# Patient Record
Sex: MALE | Race: WHITE | Employment: FULL TIME | URBAN - METROPOLITAN AREA
[De-identification: names, ages, dates, MRNs, and addresses within clinical notes are randomized per-mention and may not be internally consistent; named-entity substitution may affect disease eponyms.]

---

## 2017-10-17 ENCOUNTER — OFFICE VISIT (OUTPATIENT)
Dept: FAMILY MEDICINE | Facility: CLINIC | Age: 37
End: 2017-10-17
Payer: COMMERCIAL

## 2017-10-17 VITALS
HEART RATE: 82 BPM | OXYGEN SATURATION: 97 % | WEIGHT: 210 LBS | TEMPERATURE: 98.6 F | BODY MASS INDEX: 28.09 KG/M2 | DIASTOLIC BLOOD PRESSURE: 91 MMHG | SYSTOLIC BLOOD PRESSURE: 145 MMHG

## 2017-10-17 DIAGNOSIS — A69.20 ERYTHEMA MIGRANS (LYME DISEASE): Primary | ICD-10-CM

## 2017-10-17 PROCEDURE — 99213 OFFICE O/P EST LOW 20 MIN: CPT | Performed by: INTERNAL MEDICINE

## 2017-10-17 RX ORDER — DOXYCYCLINE 100 MG/1
100 CAPSULE ORAL 2 TIMES DAILY
Qty: 20 CAPSULE | Refills: 0 | Status: SHIPPED | OUTPATIENT
Start: 2017-10-17 | End: 2017-10-27

## 2017-10-17 NOTE — PATIENT INSTRUCTIONS
Will be conservative and treatment rash as erythema migrans/early stage lymes dissease    Doxycycline 100 mg 2 x day for 10 days  Observe for other symptoms of lymes  Then recheck    Call or return to clinic if symptoms worsen or fail to improve as anticipated.      Lyme Disease  Lyme disease is caused by bacteria. The infection is most often passed during the bite of a deer tick. The tick is very small, so many people with Lyme disease do not know they have been bitten. Tests for Lyme disease are not always accurate early in the disease. If the disease is suspected, treatment may begin before testing confirms the infection. A long course of antibiotics is the standard treatment.  If untreated, Lyme disease can worsen and full-body symptoms can develop          Early local symptoms may appear within a few days to a month after the tick bite. These symptoms may include a round, red rash that looks like a bull's-eye target with darker outer ring and a darker center. There may fever, chills, fatigue, body aches, and headache. In time, the rash goes away, even without treatment. That doesn't mean the infection has gone away, however. In some cases, early local symptoms never develop.    Early disseminated symptoms may appear weeks to months after the bite. These can include muscle aches, fatigue, fever, headache, stiff neck, and joint pain and swelling.    Late-stage symptoms include weakness in an arm, leg or one side of the face, headache, fever, and numbness and tingling in the arms or legs, confusion, and memory loss.  Testing is done for the presence of the bacteria. When the infection is treated early, it can be cured. In some cases, a second or third course of antibiotics may be needed. Be sure to follow your healthcare providers directions about treatment.  Home care  If oral antibiotics have been prescribed, take them exactly as directed until they are completely gone. Do not stop taking them until you have  taken the full course or your healthcare provider has told you to stop.  Ask your healthcare provider about taking over-the-counter medicines to control symptoms such as aches and fever.  Follow-up care  Follow up with your healthcare provider as advised. Be sure to return for follow-up testing as directed to be sure the infection has been treated.  When to seek medical advice  Call your healthcare provider right away if any of the following occur:    Current symptoms get worse    Unexplained fever, neck pain or stiffness, or headache    Arm, leg or facial weakness    Joint pain or swelling    Numbness and tingling in the arms or legs    Confusion or memory loss    Irregular or rapid heartbeat  Date Last Reviewed: 9/25/2015 2000-2017 [employer]. [address] [place] [address], [place] [address]. All rights reserved. This information is not intended as a substitute for professional medical care. Always follow your healthcare professional's instructions.

## 2017-10-17 NOTE — NURSING NOTE
"Chief Complaint   Patient presents with     Insect Bites     Pt in clinic to have eval for right flank tick bite.       Initial BP (!) 145/91  Pulse 82  Temp 98.6  F (37  C) (Oral)  Wt 95.3 kg (210 lb)  SpO2 97%  BMI 28.09 kg/m2 Estimated body mass index is 28.09 kg/(m^2) as calculated from the following:    Height as of 1/26/16: 1.842 m (6' 0.5\").    Weight as of this encounter: 95.3 kg (210 lb).  Medication Reconciliation: complete   Joleen Gardner/ MA    "

## 2017-10-17 NOTE — PROGRESS NOTES
SUBJECTIVE:  Pb Stein, a 37 year old male scheduled an appointment to discuss the following issues:  Chief Complaint   Patient presents with     Insect Bites     Pt in clinic to have eval for right flank tick bite.       Found engorged deer on right flank - 5 days ago  Bite is sore  Started to get rash following day 4 days ago    3rd time has had a bite  Water fowl hunting.      Seen at [facility] but did not feel comfortable giving treatment for prophyllaxis    Patient Active Problem List   Diagnosis     CARDIOVASCULAR SCREENING; LDL GOAL LESS THAN 160     Refugio's syndrome pupil     Past Surgical History:   Procedure Laterality Date     C ORAL SURGERY PROCEDURE  01/2006    Removed jaw bone due to infection     HC CREATE EARDRUM OPENING,GEN ANESTH  01/1989    x 3,        Social History   Substance Use Topics     Smoking status: Never Smoker     Smokeless tobacco: Never Used     Alcohol use Yes      Comment: social     Family History   Problem Relation Age of Onset     Family History Negative Mother      Hypertension Father      Family History Negative Sister      Family History Negative Brother      Family History Negative Brother              Current Outpatient Prescriptions on File Prior to Visit:  NO ACTIVE MEDICATIONS .     No current facility-administered medications on file prior to visit.       Medical, social, surgical, and family histories reviewed and updated as of 10/17/2017.    ROS:  C: NEGATIVE for fever, chills  M: NEGATIVE for significant arthralgias or myalgia  N: NEGATIVE for weakness,  or headache    OBJECTIVE:  BP (!) 145/91  Pulse 82  Temp 98.6  F (37  C) (Oral)  Wt 210 lb (95.3 kg)  SpO2 97%  BMI 28.09 kg/m2  EXAM:  GENERAL APPEARANCE: healthy, alert and no distress  SKIN: 2 cm red indurated area consistent with  bite. It appears to be some lightening of skin around the central area. This possibly could be atypical target lesion/early erythema migrans   LYMPHATICS: axillary: no  adenopathy    ASSESSMENT/PLAN:    ASSESSMENT/PLAN:      ICD-10-CM    1. Erythema migrans (Lyme disease) A69.20 doxycycline (VIBRAMYCIN) 100 MG capsule    patient missed the time frame for Lyme prophylaxis.  Will treat for early Lyme disease by diagnosis of erythema migrans    Patient Instructions   Will be conservative and treatment rash as erythema migrans/early stage lymes dissease    Doxycycline 100 mg 2 x day for 10 days  Observe for other symptoms of lymes  Then recheck    Call or return to clinic if symptoms worsen or fail to improve as anticipated.      Lyme Disease  Lyme disease is caused by bacteria. The infection is most often passed during the bite of a deer tick. The tick is very small, so many people with Lyme disease do not know they have been bitten. Tests for Lyme disease are not always accurate early in the disease. If the disease is suspected, treatment may begin before testing confirms the infection. A long course of antibiotics is the standard treatment.  If untreated, Lyme disease can worsen and full-body symptoms can develop          Early local symptoms may appear within a few days to a month after the tick bite. These symptoms may include a round, red rash that looks like a bull's-eye target with darker outer ring and a darker center. There may fever, chills, fatigue, body aches, and headache. In time, the rash goes away, even without treatment. That doesn't mean the infection has gone away, however. In some cases, early local symptoms never develop.    Early disseminated symptoms may appear weeks to months after the bite. These can include muscle aches, fatigue, fever, headache, stiff neck, and joint pain and swelling.    Late-stage symptoms include weakness in an arm, leg or one side of the face, headache, fever, and numbness and tingling in the arms or legs, confusion, and memory loss.  Testing is done for the presence of the bacteria. When the infection is treated early, it can be cured.  In some cases, a second or third course of antibiotics may be needed. Be sure to follow your healthcare providers directions about treatment.  Home care  If oral antibiotics have been prescribed, take them exactly as directed until they are completely gone. Do not stop taking them until you have taken the full course or your healthcare provider has told you to stop.  Ask your healthcare provider about taking over-the-counter medicines to control symptoms such as aches and fever.  Follow-up care  Follow up with your healthcare provider as advised. Be sure to return for follow-up testing as directed to be sure the infection has been treated.  When to seek medical advice  Call your healthcare provider right away if any of the following occur:    Current symptoms get worse    Unexplained fever, neck pain or stiffness, or headache    Arm, leg or facial weakness    Joint pain or swelling    Numbness and tingling in the arms or legs    Confusion or memory loss    Irregular or rapid heartbeat  Date Last Reviewed: 9/25/2015 2000-2017 [employer]. [address] [place] [address]. All rights reserved. This information is not intended as a substitute for professional medical care. Always follow your healthcare professional's instructions.              Brooke Almeida MD

## 2019-10-23 ENCOUNTER — OFFICE VISIT (OUTPATIENT)
Dept: FAMILY MEDICINE | Facility: CLINIC | Age: 39
End: 2019-10-23
Payer: COMMERCIAL

## 2019-10-23 VITALS
OXYGEN SATURATION: 100 % | WEIGHT: 226 LBS | HEIGHT: 74 IN | HEART RATE: 70 BPM | DIASTOLIC BLOOD PRESSURE: 86 MMHG | RESPIRATION RATE: 16 BRPM | SYSTOLIC BLOOD PRESSURE: 122 MMHG | TEMPERATURE: 98 F | BODY MASS INDEX: 29 KG/M2

## 2019-10-23 DIAGNOSIS — L98.9 SKIN LESION: Primary | ICD-10-CM
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
## 2019-10-23 DIAGNOSIS — Z23 NEED FOR VACCINATION: ICD-10-CM

## 2019-10-23 PROCEDURE — 90471 IMMUNIZATION ADMIN: CPT | Performed by: FAMILY MEDICINE

## 2019-10-23 PROCEDURE — 99202 OFFICE O/P NEW SF 15 MIN: CPT | Mod: 25 | Performed by: FAMILY MEDICINE

## 2019-10-23 PROCEDURE — 90715 TDAP VACCINE 7 YRS/> IM: CPT | Performed by: FAMILY MEDICINE

## 2019-10-23 RX ORDER — DOXYCYCLINE 100 MG/1
200 CAPSULE ORAL ONCE
Qty: 2 CAPSULE | Refills: 0 | Status: SHIPPED | OUTPATIENT
Start: 2019-10-23 | End: 2019-10-23

## 2019-10-23 ASSESSMENT — MIFFLIN-ST. JEOR: SCORE: 1991.94

## 2019-10-23 NOTE — NURSING NOTE
Prior to immunization administration, verified patients identity using patient s name and date of birth. Please see Immunization Activity for additional information.     Screening Questionnaire for Adult Immunization    Are you sick today?   No   Do you have allergies to medications, food, a vaccine component or latex?   No   Have you ever had a serious reaction after receiving a vaccination?   No   Do you have a long-term health problem with heart disease, lung disease, asthma, kidney disease, metabolic disease (e.g. diabetes), anemia, or other blood disorder?   No   Do you have cancer, leukemia, HIV/AIDS, or any other immune system problem?   No   In the past 3 months, have you taken medications that affect  your immune system, such as prednisone, other steroids, or anticancer drugs; drugs for the treatment of rheumatoid arthritis, Crohn s disease, or psoriasis; or have you had radiation treatments?   No   Have you had a seizure, or a brain or other nervous system problem?   No   During the past year, have you received a transfusion of blood or blood     products, or been given immune (gamma) globulin or antiviral drug?   No   For women: Are you pregnant or is there a chance you could become        pregnant during the next month?   No   Have you received any vaccinations in the past 4 weeks?   No     Immunization questionnaire answers were all negative.        Per orders of Dr. Connor, injection of Tdap (adacel) given by Shadia Mercedes. Patient instructed to remain in clinic for 15 minutes afterwards, and to report any adverse reaction to me immediately.       Screening performed by Shadia Mercedes on 10/23/2019 at 2:56 PM.

## 2019-10-23 NOTE — PROGRESS NOTES
"Subjective     Pb Stein is a 39 year old male who presents to clinic today for the following health issues:    HPI     Insect/bug bite-     Onset: unsure but removed tick on Monday      Description:        Type of insect: deer tick        Location of bite: lower right side of back     Accompanying Signs & Symptoms:        Itching: YES- slightly               Redness: YES        Warmth: YES        Swelling: YES        Pain: mild, moderate        Drainage: no        Fever: no        Chest Pain: no        Shortness of breath: no                              History of allergic reactions:    Anaphylaxis: no  Hives: no       If so, did you have/use an epi-pen:  not applicable    Therapies tried and outcome: removed deer tick     He is not sure how long tick stayed on his skin.  He removed it Monday.  He knows it was a deer tick.  He is not having any symptoms except for redness of his skin.  His mother had severe form of Lyme and he is worried about it.      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    Review of system: for general, respiratory, CVS, GI and psychiatry negative except for noted above.     EXAM:  /86 (BP Location: Left arm, Patient Position: Sitting, Cuff Size: Adult Large)   Pulse 70   Temp 98  F (36.7  C) (Oral)   Resp 16   Ht 1.867 m (6' 1.5\")   Wt 102.5 kg (226 lb)   SpO2 " 100%   BMI 29.41 kg/m    Constitutional: healthy, alert and no distress   Psychiatric: mentation appears normal and affect normal/bright  Right flank area just above his right hip around 2 cm x 2 cm erythematous skin lesion present.  Nontender.  No raised temperature of skin.    ASSESSMENT / PLAN:  (L98.9) Skin lesion  (primary encounter diagnosis)  Comment: From known deer tick.  It has been removed within 72 hours but I am not sure if it stayed on his skin for 36 hours or more.  He does believe in Lyme prevalent area and with his current fear I think it is reasonable to treat him with 200 milligrams of doxycycline single dose.  He is going to come back for Lyme testing and for 14 days to get accurate results.  Currently asymptomatic.  If he develops symptoms or if he develops bull's-eye type of skin rash she may needs to be seen sooner.  Plan: doxycycline hyclate (VIBRAMYCIN) 100 MG         capsule, **Lyme Disease Ida with reflex to WB         Serum FUTURE 14d             (W57.XXXA) Tick bite, initial encounter  Comment:    Plan: doxycycline hyclate (VIBRAMYCIN) 100 MG         capsule, **Lyme Disease Ida with reflex to WB         Serum FUTURE 14d             (Z23) Need for vaccination  Comment:    Plan: TDAP, IM (10 - 64 YRS) - Adacel                  The above note was dictated using voice recognition. Although reviewed after completion, some word and grammatical error may remain .

## 2019-11-25 DIAGNOSIS — L98.9 SKIN LESION: ICD-10-CM
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
## 2019-11-25 PROCEDURE — 36415 COLL VENOUS BLD VENIPUNCTURE: CPT | Performed by: FAMILY MEDICINE

## 2019-11-25 PROCEDURE — 86618 LYME DISEASE ANTIBODY: CPT | Performed by: FAMILY MEDICINE

## 2019-11-26 LAB — B BURGDOR IGG+IGM SER QL: 0.08 (ref 0–0.89)

## 2020-02-10 ENCOUNTER — HEALTH MAINTENANCE LETTER (OUTPATIENT)
Age: 40
End: 2020-02-10

## 2020-11-14 ENCOUNTER — HEALTH MAINTENANCE LETTER (OUTPATIENT)
Age: 40
End: 2020-11-14

## 2021-03-28 ENCOUNTER — HEALTH MAINTENANCE LETTER (OUTPATIENT)
Age: 41
End: 2021-03-28

## 2021-09-12 ENCOUNTER — HEALTH MAINTENANCE LETTER (OUTPATIENT)
Age: 41
End: 2021-09-12

## 2022-04-24 ENCOUNTER — HEALTH MAINTENANCE LETTER (OUTPATIENT)
Age: 42
End: 2022-04-24

## 2022-11-19 ENCOUNTER — HEALTH MAINTENANCE LETTER (OUTPATIENT)
Age: 42
End: 2022-11-19

## 2023-06-01 ENCOUNTER — HEALTH MAINTENANCE LETTER (OUTPATIENT)
Age: 43
End: 2023-06-01

## 2024-04-25 NOTE — MR AVS SNAPSHOT
After Visit Summary   10/17/2017    Pb Stein    MRN: [id number]           Patient Information     Date Of Birth          1980        Visit Information        Provider Department      10/17/2017 3:45 PM Brooke Almeida MD [facility]        Today's Diagnoses     Erythema migrans (Lyme disease)    -  1      Care Instructions    Will be conservative and treatment rash as erythema migrans/early stage lymes dissease    Doxycycline 100 mg 2 x day for 10 days  Observe for other symptoms of lymes  Then recheck    Call or return to clinic if symptoms worsen or fail to improve as anticipated.      Lyme Disease  Lyme disease is caused by bacteria. The infection is most often passed during the bite of a deer tick. The tick is very small, so many people with Lyme disease do not know they have been bitten. Tests for Lyme disease are not always accurate early in the disease. If the disease is suspected, treatment may begin before testing confirms the infection. A long course of antibiotics is the standard treatment.  If untreated, Lyme disease can worsen and full-body symptoms can develop          Early local symptoms may appear within a few days to a month after the tick bite. These symptoms may include a round, red rash that looks like a bull's-eye target with darker outer ring and a darker center. There may fever, chills, fatigue, body aches, and headache. In time, the rash goes away, even without treatment. That doesn't mean the infection has gone away, however. In some cases, early local symptoms never develop.    Early disseminated symptoms may appear weeks to months after the bite. These can include muscle aches, fatigue, fever, headache, stiff neck, and joint pain and swelling.    Late-stage symptoms include weakness in an arm, leg or one side of the face, headache, fever, and numbness and tingling in the arms or legs, confusion, and memory loss.  Testing is done for the  Assessment per SGNA guidelines  Non labored breathing, skin dry warm and appropriate for race.Abdomen soft     presence of the bacteria. When the infection is treated early, it can be cured. In some cases, a second or third course of antibiotics may be needed. Be sure to follow your healthcare providers directions about treatment.  Home care  If oral antibiotics have been prescribed, take them exactly as directed until they are completely gone. Do not stop taking them until you have taken the full course or your healthcare provider has told you to stop.  Ask your healthcare provider about taking over-the-counter medicines to control symptoms such as aches and fever.  Follow-up care  Follow up with your healthcare provider as advised. Be sure to return for follow-up testing as directed to be sure the infection has been treated.  When to seek medical advice  Call your healthcare provider right away if any of the following occur:    Current symptoms get worse    Unexplained fever, neck pain or stiffness, or headache    Arm, leg or facial weakness    Joint pain or swelling    Numbness and tingling in the arms or legs    Confusion or memory loss    Irregular or rapid heartbeat  Date Last Reviewed: 9/25/2015 2000-2017 [employer]. [address] [place] [address]. All rights reserved. This information is not intended as a substitute for professional medical care. Always follow your healthcare professional's instructions.                Follow-ups after your visit        Who to contact     If you have questions or need follow up information about today's clinic visit or your schedule please contact [facility] directly at [phone].  Normal or non-critical lab and imaging results will be communicated to you by MyChart, letter or phone within 4 business days after the clinic has received the results. If you do not hear from us within 7 days, please contact the clinic through MyChart or phone. If you have a critical or abnormal lab result, we will notify you by phone as soon as  "possible.  Submit refill requests through [employer] or call your pharmacy and they will forward the refill request to us. Please allow 3 business days for your refill to be completed.          Additional Information About Your Visit        [employer] Information     [employer] lets you send messages to your doctor, view your test results, renew your prescriptions, schedule appointments and more. To sign up, go to [email or web address] . Click on \"Log in\" on the left side of the screen, which will take you to the Welcome page. Then click on \"Sign up Now\" on the right side of the page.     You will be asked to enter the access code listed below, as well as some personal information. Please follow the directions to create your username and password.     Your access code is: [email or web address]  Expires: 1/15/2018  4:12 PM     Your access code will  in 90 days. If you need help or a new code, please call your [place] clinic or [phone].        Care EveryWhere ID     This is your Care EveryWhere ID. This could be used by other organizations to access your [place] medical records  [id number]        Your Vitals Were     Pulse Temperature Pulse Oximetry BMI (Body Mass Index)          82 98.6  F (37  C) (Oral) 97% 28.09 kg/m2         Blood Pressure from Last 3 Encounters:   10/17/17 (!) 145/91   16 122/72   16 126/70    Weight from Last 3 Encounters:   10/17/17 210 lb (95.3 kg)   16 207 lb (93.9 kg)   16 210 lb 8 oz (95.5 kg)              Today, you had the following     No orders found for display         Today's Medication Changes          These changes are accurate as of: 10/17/17  4:12 PM.  If you have any questions, ask your nurse or doctor.               Start taking these medicines.        Dose/Directions    doxycycline 100 MG capsule   Commonly known as:  VIBRAMYCIN   Used for:  Erythema migrans (Lyme disease)   Started by:  Brooke Almeida MD        Dose:  100 mg   Take 1 capsule " (100 mg) by mouth 2 times daily for 10 days   Quantity:  20 capsule   Refills:  0            Where to get your medicines      These medications were sent to [facility] [address] [address] AT [facility] & [address] [place] [id number]    Hours:  24-hours Phone:  [phone]     doxycycline 100 MG capsule                Primary Care Provider Office Phone # Fax #    Malini Leroy Adiel, APRN [phone] [address] [facility] [address]        Equal Access to Services     ELIZABETH [facility] UnitMIKAYLA : Hadii aad ku hadasho Soomaali, waaxda luqadaha, qaybta kaalmada adeegyada, waxmaster colvin haybritneyn carlos munson . So [facility] [phone].    ATENCIÓN: Si habla español, tiene a villagran disposición servicios gratuitos de asistencia lingüística. Llame al [phone].    We comply with applicable federal civil rights laws and [place] laws. We do not discriminate on the basis of race, color, national origin, age, disability, sex, sexual orientation, or gender identity.            Thank you!     Thank you for choosing [facility]  for your care. Our goal is always to provide you with excellent care. Hearing back from our patients is one way we can continue to improve our services. Please take a few minutes to complete the written survey that you may receive in the mail after your visit with us. Thank you!             Your Updated Medication List - Protect others around you: Learn how to safely use, store and throw away your medicines at www.disposemymeds.org.          This list is accurate as of: 10/17/17  4:12 PM.  Always use your most recent med list.                   Brand Name Dispense Instructions for use Diagnosis    doxycycline 100 MG capsule    VIBRAMYCIN    20 capsule    Take 1 capsule (100 mg) by mouth 2 times daily for 10 days    Erythema migrans (Lyme disease)       NO ACTIVE MEDICATIONS      .